# Patient Record
Sex: MALE
[De-identification: names, ages, dates, MRNs, and addresses within clinical notes are randomized per-mention and may not be internally consistent; named-entity substitution may affect disease eponyms.]

---

## 2018-05-19 ENCOUNTER — HOSPITAL ENCOUNTER (EMERGENCY)
Dept: HOSPITAL 31 - C.ER | Age: 43
Discharge: HOME | End: 2018-05-19
Payer: MEDICAID

## 2018-05-19 VITALS
RESPIRATION RATE: 16 BRPM | HEART RATE: 89 BPM | SYSTOLIC BLOOD PRESSURE: 142 MMHG | TEMPERATURE: 98.4 F | OXYGEN SATURATION: 98 % | DIASTOLIC BLOOD PRESSURE: 91 MMHG

## 2018-05-19 DIAGNOSIS — N49.2: Primary | ICD-10-CM

## 2018-05-19 NOTE — C.PDOC
History Of Present Illness


43yo male, with no past medical history, presents to ER with complaints of an 

abscess to his groin for the past 3 days. He reports taking some antibiotics 

from [facility] without relief. Pt went to see his PMD today, he was instructed to 

come to the ER for evaluation. Patient denies any fever, chills, dysuria, 

perirectal pain, testicular pain, dysuria, or n/v.





PMD:Dr. MARILU Méndez


Time Seen by Provider: 05/19/18 14:17


Chief Complaint (Nursing): Abnormal Skin Integrity


History Per: Patient, Family


History/Exam Limitations: no limitations


Onset/Duration Of Symptoms: Days (3)


Current Symptoms Are (Timing): Still Present


Quality Of Symptoms: Swollen


Additional History Per: Patient





Past Medical History


Reviewed: Historical Data, Nursing Documentation, Vital Signs


Vital Signs: 


 Last Vital Signs











Temp  98.4 F   05/19/18 14:14


 


Pulse  89   05/19/18 14:14


 


Resp  16   05/19/18 14:14


 


BP  142/91 H  05/19/18 14:14


 


Pulse Ox  98   05/19/18 15:18














- Medical History


PMH: No Chronic Diseases


Surgical History: No Surg Hx


Family History: States: No Known Family Hx





- Social History


Hx Tobacco Use: No


Hx Alcohol Use: No


Hx Substance Use: No





- Immunization History


Hx Tetanus Toxoid Vaccination: No


Hx Influenza Vaccination: No


Hx Pneumococcal Vaccination: No





Review Of Systems


Except As Marked, All Systems Reviewed And Found Negative.


Constitutional: Negative for: Fever, Chills


Skin: Positive for: Other (abscess to left groin area)





Physical Exam





- Physical Exam


Appears: Non-toxic, No Acute Distress


Skin: Warm


Head: Atraumatic, Normacephalic


Eye(s): bilateral: Normal Inspection, EOMI


Nose: Normal


Oral Mucosa: Moist


Neck: Normal ROM, Supple


Chest: Symmetrical


Respiratory: No Accessory Muscle Use


Gastrointestinal/Abdominal: Soft


Male Genital: No Testicular Tenderness, Other (3x2 cm area of erythema, 

tenderness and swelling to left inferior scrotum.)


Extremity: Normal ROM


Neurological/Psych: Oriented x3, Normal Speech





ED Course And Treatment


O2 Sat by Pulse Oximetry: 98 (RA)


Pulse Ox Interpretation: Normal


Progress Note: Patient given Motrin 600mg PO and 1 tab percocet for pain 

relief. Patient seen and evaluated by  Dr. Kumar , agreed upon plan and 

treatment.  For incision and drainage, Lidocaine with epi used for local 

anesthesia. Patient was able to tolerate procedure well; he reports marked 

improvement in his symptoms. Patient remains awake, alert and is in no acute 

distress.  Instructed to follow up with primary medical doctor or clinic in 2 

days for further evaluation. Take medications as prescribed. Return to the 

emergency department at any time if symptoms persist or worsen.





- Incision & Drainage Of Abscess


Anesthesia: Lidocaine 1%, With Epi


Used During Procedure: Continuous Pulse Oximetry


Prep Used: Sterile Water, Betadine


Procedure: Incised W/Scalpel Blade#: (11), Drained Pus, Irrigated Cavity W/

Saline, Probed To Break Up Loculations, Packed W/Gauze (.25 inch), Cultures 

Obtained And Sent To Lab





Disposition





- Disposition


Disposition: HOME/ ROUTINE


Disposition Time: 15:03


Condition: STABLE


Additional Instructions: 


Wound check in 2 days. Return to ER sooner if symptoms worsen. 


Prescriptions: 


Cephalexin [cephalexin] 500 mg PO BID #14 cap


Naproxen [Naprosyn] 1 tab PO BID PRN #20 tab


 PRN Reason: Pain


Sulfamethoxazole/Trimethoprim [Bactrim  mg-160 mg] 1 tab PO BID #14 tab


Instructions:  Abscess Incision and Drainage (DC)


Forms:  [facility] (English)





- Clinical Impression


Clinical Impression: 


 Scrotal abscess








- PA / NP / Resident Statement


MD/DO has reviewed & agrees with the documentation as recorded.





- Scribe Statement


The provider has reviewed the documentation as recorded by the Scribe (Monique Ramey)


Provider Attestation:


All medical record entries made by the Scribe were at my direction and 

personally dictated by me. I have reviewed the chart and agree that the record 

accurately reflects my personal performance of the history, physical exam, 

medical decision making, and the department course for this patient. I have 

also personally directed, reviewed, and agree with the discharge instructions 

and disposition.

## 2018-05-21 ENCOUNTER — HOSPITAL ENCOUNTER (EMERGENCY)
Dept: HOSPITAL 31 - C.ER | Age: 43
Discharge: HOME | End: 2018-05-21
Payer: MEDICAID

## 2018-05-21 VITALS
DIASTOLIC BLOOD PRESSURE: 84 MMHG | TEMPERATURE: 98.1 F | HEART RATE: 80 BPM | RESPIRATION RATE: 20 BRPM | OXYGEN SATURATION: 99 % | SYSTOLIC BLOOD PRESSURE: 128 MMHG

## 2018-05-21 DIAGNOSIS — Z48.00: Primary | ICD-10-CM

## 2018-05-21 NOTE — C.PDOC
History Of Present Illness


41 y/o male presents to the ER for wound check to the left scrotum. Patient 

states that he had an I&D of an abscess in the left scrotum. Patient was 

discharged with prescriptions for Bactrim and Keflex. Of note, the wound 

culture did not show any growth after 24 hours. Patient notes that he feels 

better and he denies having any complaints at this time.


Time Seen by Provider: 05/21/18 13:32


Chief Complaint (Nursing): Wound Check


History Per: Patient


History/Exam Limitations: no limitations





Past Medical History


Reviewed: Historical Data, Nursing Documentation, Vital Signs


Vital Signs: 


 Last Vital Signs











Temp  98.1 F   05/21/18 13:17


 


Pulse  80   05/21/18 13:17


 


Resp  20   05/21/18 13:17


 


BP  128/84   05/21/18 13:17


 


Pulse Ox  99   05/21/18 15:49














- Medical History


PMH: No Chronic Diseases


Surgical History: No Surg Hx


Family History: States: No Known Family Hx





- Social History


Hx Tobacco Use: No


Hx Alcohol Use: No


Hx Substance Use: No





- Immunization History


Hx Tetanus Toxoid Vaccination: No


Hx Influenza Vaccination: No


Hx Pneumococcal Vaccination: No





Review Of Systems


Except As Marked, All Systems Reviewed And Found Negative.


Constitutional: Negative for: Fever, Chills





Physical Exam





- Physical Exam


Appears: Non-toxic, No Acute Distress


Skin: Normal Color, Warm, Other (packed abscess in left scrotum with no 

drainage and no bleeding)


Head: Atraumatic, Normacephalic


Eye(s): bilateral: Normal Inspection


Male Genital: No Scrotal Swelling, Other (healing left scrotal abscess)


Extremity: Normal ROM


Neurological/Psych: Oriented x3, Normal Speech, Normal Cognition





ED Course And Treatment


O2 Sat by Pulse Oximetry: 99 (RA)


Pulse Ox Interpretation: Normal


Progress Note: Packing was removed with no difficulty. There was no purulent 

drainage. The wound was irrigated with normal saline and dressed with sterile 

dressing. Patient tolerated well. Patient has been discharged and and 

instructed to follow up with PMD in 1-2 days.





Disposition





- Disposition


Disposition: HOME/ ROUTINE


Disposition Time: 14:02


Condition: STABLE


Additional Instructions: 


Follow up with your PMD within 1-2 days. Return to ED if feel worse. Continue 

medications as instructed.


Instructions:  Wound Care (DC)


Forms:  [facility] (English)





- Clinical Impression


Clinical Impression: 


 Wound check, abscess








- PA / NP / Resident Statement


MD/DO has reviewed & agrees with the documentation as recorded.





- Scribe Statement


The provider has reviewed the documentation as recorded by the Scribe


Jam Biggs





Provider Attestation





All medical record entries made by the Scribe were at my direction and 

personally dictated by me. I have reviewed the chart and agree that the record 

accurately reflects my personal performance of the history, physical exam, 

medical decision making, and the department course for this patient. I have 

also personally directed, reviewed, and agree with the discharge instructions 

and disposition.